# Patient Record
Sex: FEMALE | Race: WHITE | NOT HISPANIC OR LATINO | Employment: FULL TIME | URBAN - METROPOLITAN AREA
[De-identification: names, ages, dates, MRNs, and addresses within clinical notes are randomized per-mention and may not be internally consistent; named-entity substitution may affect disease eponyms.]

---

## 2020-12-28 ENCOUNTER — APPOINTMENT (OUTPATIENT)
Dept: GENERAL RADIOLOGY | Facility: CLINIC | Age: 50
End: 2020-12-28
Attending: PHYSICIAN ASSISTANT

## 2020-12-28 ENCOUNTER — HOSPITAL ENCOUNTER (EMERGENCY)
Facility: CLINIC | Age: 50
Discharge: HOME | End: 2020-12-29
Attending: PHYSICIAN ASSISTANT | Admitting: PHYSICIAN ASSISTANT
Payer: COMMERCIAL

## 2020-12-28 DIAGNOSIS — R42 DIZZINESS: ICD-10-CM

## 2020-12-28 DIAGNOSIS — E87.6 HYPOKALEMIA: ICD-10-CM

## 2020-12-28 DIAGNOSIS — R07.9 CHEST PAIN: ICD-10-CM

## 2020-12-28 LAB
ALBUMIN SERPL-MCNC: 3.4 G/DL (ref 3.4–5)
ALP SERPL-CCNC: 78 U/L (ref 40–150)
ALT SERPL W P-5'-P-CCNC: 13 U/L (ref 0–50)
ANION GAP SERPL CALCULATED.3IONS-SCNC: 6 MMOL/L (ref 3–14)
AST SERPL W P-5'-P-CCNC: 13 U/L (ref 0–45)
BASOPHILS # BLD AUTO: 0 10E9/L (ref 0–0.2)
BASOPHILS NFR BLD AUTO: 0.7 %
BILIRUB DIRECT SERPL-MCNC: <0.1 MG/DL (ref 0–0.2)
BILIRUB SERPL-MCNC: 0.2 MG/DL (ref 0.2–1.3)
BUN SERPL-MCNC: 15 MG/DL (ref 7–30)
CALCIUM SERPL-MCNC: 9.2 MG/DL (ref 8.5–10.1)
CHLORIDE SERPL-SCNC: 103 MMOL/L (ref 94–109)
CO2 SERPL-SCNC: 31 MMOL/L (ref 20–32)
CREAT SERPL-MCNC: 1.02 MG/DL (ref 0.52–1.04)
DIFFERENTIAL METHOD BLD: ABNORMAL
EOSINOPHIL # BLD AUTO: 0.2 10E9/L (ref 0–0.7)
EOSINOPHIL NFR BLD AUTO: 3.4 %
ERYTHROCYTE [DISTWIDTH] IN BLOOD BY AUTOMATED COUNT: 14.2 % (ref 10–15)
GFR SERPL CREATININE-BSD FRML MDRD: 65 ML/MIN/{1.73_M2}
GLUCOSE SERPL-MCNC: 102 MG/DL (ref 70–99)
HCT VFR BLD AUTO: 41.8 % (ref 35–47)
HGB BLD-MCNC: 13.1 G/DL (ref 11.7–15.7)
IMM GRANULOCYTES # BLD: 0 10E9/L (ref 0–0.4)
IMM GRANULOCYTES NFR BLD: 0.2 %
LIPASE SERPL-CCNC: 367 U/L (ref 73–393)
LYMPHOCYTES # BLD AUTO: 1.7 10E9/L (ref 0.8–5.3)
LYMPHOCYTES NFR BLD AUTO: 27.5 %
MCH RBC QN AUTO: 27.9 PG (ref 26.5–33)
MCHC RBC AUTO-ENTMCNC: 31.3 G/DL (ref 31.5–36.5)
MCV RBC AUTO: 89 FL (ref 78–100)
MONOCYTES # BLD AUTO: 0.5 10E9/L (ref 0–1.3)
MONOCYTES NFR BLD AUTO: 8 %
NEUTROPHILS # BLD AUTO: 3.7 10E9/L (ref 1.6–8.3)
NEUTROPHILS NFR BLD AUTO: 60.2 %
NRBC # BLD AUTO: 0 10*3/UL
NRBC BLD AUTO-RTO: 0 /100
PLATELET # BLD AUTO: 261 10E9/L (ref 150–450)
POTASSIUM SERPL-SCNC: 3.2 MMOL/L (ref 3.4–5.3)
PROT SERPL-MCNC: 7.3 G/DL (ref 6.8–8.8)
RBC # BLD AUTO: 4.7 10E12/L (ref 3.8–5.2)
SODIUM SERPL-SCNC: 140 MMOL/L (ref 133–144)
TROPONIN I SERPL-MCNC: <0.015 UG/L (ref 0–0.04)
WBC # BLD AUTO: 6.1 10E9/L (ref 4–11)

## 2020-12-28 PROCEDURE — 96361 HYDRATE IV INFUSION ADD-ON: CPT

## 2020-12-28 PROCEDURE — 71046 X-RAY EXAM CHEST 2 VIEWS: CPT

## 2020-12-28 PROCEDURE — 80048 BASIC METABOLIC PNL TOTAL CA: CPT | Performed by: EMERGENCY MEDICINE

## 2020-12-28 PROCEDURE — 258N000003 HC RX IP 258 OP 636: Performed by: PHYSICIAN ASSISTANT

## 2020-12-28 PROCEDURE — 99285 EMERGENCY DEPT VISIT HI MDM: CPT | Mod: 25

## 2020-12-28 PROCEDURE — 250N000013 HC RX MED GY IP 250 OP 250 PS 637: Performed by: PHYSICIAN ASSISTANT

## 2020-12-28 PROCEDURE — 85025 COMPLETE CBC W/AUTO DIFF WBC: CPT | Performed by: EMERGENCY MEDICINE

## 2020-12-28 PROCEDURE — 96360 HYDRATION IV INFUSION INIT: CPT

## 2020-12-28 PROCEDURE — 80076 HEPATIC FUNCTION PANEL: CPT | Performed by: EMERGENCY MEDICINE

## 2020-12-28 PROCEDURE — 83690 ASSAY OF LIPASE: CPT | Performed by: EMERGENCY MEDICINE

## 2020-12-28 PROCEDURE — 84484 ASSAY OF TROPONIN QUANT: CPT | Performed by: EMERGENCY MEDICINE

## 2020-12-28 RX ORDER — MECLIZINE HYDROCHLORIDE 25 MG/1
25 TABLET ORAL ONCE
Status: COMPLETED | OUTPATIENT
Start: 2020-12-28 | End: 2020-12-28

## 2020-12-28 RX ORDER — POTASSIUM CHLORIDE 1500 MG/1
40 TABLET, EXTENDED RELEASE ORAL ONCE
Status: DISCONTINUED | OUTPATIENT
Start: 2020-12-28 | End: 2020-12-29 | Stop reason: HOSPADM

## 2020-12-28 RX ORDER — MECLIZINE HYDROCHLORIDE 25 MG/1
25 TABLET ORAL 3 TIMES DAILY PRN
Qty: 30 TABLET | Refills: 0 | Status: SHIPPED | OUTPATIENT
Start: 2020-12-28

## 2020-12-28 RX ADMIN — SODIUM CHLORIDE 1000 ML: 9 INJECTION, SOLUTION INTRAVENOUS at 22:25

## 2020-12-28 RX ADMIN — MECLIZINE HYDROCHLORIDE 25 MG: 25 TABLET ORAL at 22:25

## 2020-12-28 ASSESSMENT — ENCOUNTER SYMPTOMS
HEADACHES: 1
SINUS PRESSURE: 1
WEAKNESS: 0
BLOOD IN STOOL: 0
SHORTNESS OF BREATH: 1
SINUS PAIN: 1
SORE THROAT: 0
NERVOUS/ANXIOUS: 1
VOMITING: 0
NUMBNESS: 1
COUGH: 0
LIGHT-HEADEDNESS: 1
DIZZINESS: 1
ABDOMINAL PAIN: 1
DIARRHEA: 0
NAUSEA: 1
NECK PAIN: 1

## 2020-12-28 NOTE — ED AVS SNAPSHOT
[facility] Emergency Dept  [address]  [facility] [address]  Phone: [phone]  Fax: [phone]                                    Bita Umana   MRN: [id number]    Department: [facility] Emergency Dept   Date of Visit: 12/28/2020           After Visit Summary Signature Page    I have received my discharge instructions, and my questions have been answered. I have discussed any challenges I see with this plan with the nurse or doctor.    ..........................................................................................................................................  Patient/Patient Representative Signature      ..........................................................................................................................................  Patient Representative Print Name and Relationship to Patient    ..................................................               ................................................  Date                                   Time    ..........................................................................................................................................  Reviewed by Signature/Title    ...................................................              ..............................................  Date                                               Time          22EPIC Rev 08/18

## 2020-12-29 VITALS
DIASTOLIC BLOOD PRESSURE: 90 MMHG | TEMPERATURE: 97.2 F | RESPIRATION RATE: 20 BRPM | SYSTOLIC BLOOD PRESSURE: 149 MMHG | OXYGEN SATURATION: 99 % | HEART RATE: 58 BPM

## 2020-12-29 LAB — INTERPRETATION ECG - MUSE: NORMAL

## 2020-12-29 NOTE — DISCHARGE INSTRUCTIONS
Discharge Instructions  Dizziness (Lightheaded)  Today you were seen for dizziness.  Dizziness can be caused by many things and it can be very difficult to determine the cause of dizziness.  At this time, your provider has found no signs that your dizziness is due to a serious or life-threatening condition. However, sometimes there is a serious problem that does not show up right away, and it is important for you to follow up with your regular provider as instructed.  Generally, every Emergency Department visit should have a follow-up clinic visit with either a primary or a specialty clinic/provider. Please follow-up as instructed by your emergency provider today.      Return to the Emergency Department if:    You pass out (fainting or falling out), especially during exercise.    You develop chest pain, chest pressure or difficulty breathing.  Your feel an irregular heartbeat.  You have excessive vaginal bleeding, or blood in your stool or vomit (throw up).  You have a high fever.  Your symptoms get worse or more frequent.    If when you begin to feel dizzy or lightheaded, it is important to sit down or lay down immediately to prevent injury from falling.  If you were given a prescription for medicine here today, be sure to read all of the information (including the package insert) that comes with your prescription.  This will include important information about the medicine, its side effects, and any warnings that you need to know about.  The pharmacist who fills the prescription can provide more information and answer questions you may have about the medicine.  If you have questions or concerns that the pharmacist cannot address, please call or return to the Emergency Department.   Remember that you can always come back to the Emergency Department if you are not able to see your regular provider in the amount of time listed above, if you get any new symptoms, or if there is anything that worries you.    Discharge  Instructions  Vertigo  You have been diagnosed with vertigo.  This is a dizzy feeling often described as spinning or that the room is moving around you. You will often have nausea (sick to your stomach), vomiting (throwing up), and balance problems with it.  Vertigo is usually caused by a problem in the inner ear which helps control your balance.  Many things can cause vertigo, including calcium collections in the inner ear, a virus infection of the inner ear, concussion, migraine, and some medicines.  Luckily, these causes are not life threatening and will eventually go away.  However, sometimes there is a serious problem that does not show up right away.  Generally, every Emergency Department visit should have a follow-up clinic visit with either a primary or a specialty clinic/provider. Please follow-up as instructed by your emergency provider today.  Return to the Emergency Department if you have:  New or severe headache.  Double vision (seeing two of things).  Trouble speaking or hearing.  Weakness or trouble moving/using one side of your body.  Passing out.  Numbness or tingling.  Chest pain.  Vomiting that will not stop.    Treatment:  There are several commonly prescribed medications:  Antihistamines such as meclizine (Antivert ), dimenhydrinate (Dramamine ), or diphenhydramine (Benadryl ).  Prescription anti-nausea medicines, such as promethazine (Phenergan ), metoclopramide (Reglan ), or ondansetron (Zofran ).  Prescription sedative medicines, such as diazepam (Valium ), lorazepam (Ativan ), or clonazepam (Klonopin ).  Most of these medicines make you sleepy, and you should not take them before you work or drive. You should only take prescription medicines to treat severe vertigo symptoms, and you should stop the medicine when your symptoms improve.    Follow Up:  If you have vertigo longer than three days, it is important that you follow up either with your primary provider or an Ear, Nose, and Throat  (ENT) specialist.  You may need further testing to evaluate your vertigo and you may also need  vestibular  therapy which is a special form of physical therapy to make the vertigo go away.    If you were given a prescription for medicine here today, be sure to read all of the information (including the package insert) that comes with your prescription.  This will include important information about the medicine, its side effects, and any warnings that you need to know about.  The pharmacist who fills the prescription can provide more information and answer questions you may have about the medicine.  If you have questions or concerns that the pharmacist cannot address, please call or return to the Emergency Department.     Remember that you can always come back to the Emergency Department if you are not able to see your regular provider in the amount of time listed above, if you get any new symptoms, or if there is anything that worries you.    Discharge Instructions  Chest Pain    You have been seen today for chest pain or discomfort.  At this time, your provider has found no signs that your chest pain is due to a serious or life-threatening condition, (or you have declined more testing and/or admission to the hospital). However, sometimes there is a serious problem that does not show up right away. Your evaluation today may not be complete and you may need further testing and evaluation.     Generally, every Emergency Department visit should have a follow-up clinic visit with either a primary or a specialty clinic/provider. Please follow-up as instructed by your emergency provider today.  Return to the Emergency Department if:  Your chest pain changes, gets worse, starts to happen more often, or comes with less activity.  You are newly short of breath.  You get very weak or tired.  You pass out or faint.  You have any new symptoms, like fever, cough, numb legs, or you cough up blood.  You have anything else that  worries you.    Until you follow-up with your regular provider, please do the following:  Take one aspirin daily unless you have an allergy or are told not to by your provider.  If a stress test appointment has been made, go to the appointment.  If you have questions, contact your regular provider.  Follow-up with your regular provider/clinic as directed; this is very important.    If you were given a prescription for medicine here today, be sure to read all of the information (including the package insert) that comes with your prescription.  This will include important information about the medicine, its side effects, and any warnings that you need to know about.  The pharmacist who fills the prescription can provide more information and answer questions you may have about the medicine.  If you have questions or concerns that the pharmacist cannot address, please call or return to the Emergency Department.       Remember that you can always come back to the Emergency Department if you are not able to see your regular provider in the amount of time listed above, if you get any new symptoms, or if there is anything that worries you.

## 2020-12-29 NOTE — ED PROVIDER NOTES
"History   Chief Complaint:  Chest Pain     The history is provided by the patient.      Bita Umana is a 50 year old female with history of hypertension, prediabetes, sinusitis, vertigo, and AJ on CPAP, who presents with chest pain, lightheaded, \"felt like [she] was spinning,\" and shortness of breath for the last three days that developed while she was driving. She notes that she had to pull over and felt like she was going to pass out. This lasted for just a few minutes, and she was able to make it home safely. The next day, the patient developed the same symptoms in addition to mid-sternal/epigastric pain. Yesterday, the patient had a similar episode and felt like she was on a roller coaster. She notes mild dizziness and lightheadedness is present at rest but significantly worsens when she moves her head.    Today, about two hours prior to arrival the patient redeveloped her dizziness and lightheadedness, in addition to localized right arm numbness, which she notes is still present here but more mild than before. She also notes chronic numbness to the right hand 2/2 carpal tunnel and her currently right arm sensory problems feel similar to this chronic carpal tunnel issue. Patient complains of neck pain with no known falls, injury, or trauma. She also reports some intermittent headache, nausea, and left ear pressure. To note, the patient has a history of sinusitis and vertigo but states it has never been this bad before. Due to her persisting symptoms, she elected to seek evaluation in the ED today.     The patient also reports taking 100 mg of her 's Metoprolol in hopes it would bring down her high blood pressure as she has been noting increased BPs over the last couple of days.     Here, the patient reports experiencing chest pain, ear pressure, left worse than right, and dizziness. Patient denies any vomiting, diarrhea, blood in stool, sore throat, cough, cold, vision loss, difficulty " speaking or swallowing, weakness in her arms, numbness in any other extremities aside from her right arm, ill contacts, or known exposure to COVID-19. To note the patient has had a tubal ligation but still has her gall bladder and appendix. To note, the patient has an appointment with her PCP in two days regarding these symptoms.     Review of Systems   HENT: Positive for ear pain, postnasal drip, sinus pressure and sinus pain. Negative for sore throat.    Eyes: Positive for visual disturbance.   Respiratory: Positive for shortness of breath. Negative for cough.    Cardiovascular: Positive for chest pain.   Gastrointestinal: Positive for abdominal pain (epigastric) and nausea. Negative for blood in stool, diarrhea and vomiting.   Musculoskeletal: Positive for neck pain.   Neurological: Positive for dizziness, syncope (near), light-headedness, numbness (right arm) and headaches. Negative for weakness.   Psychiatric/Behavioral: The patient is nervous/anxious.    All other systems reviewed and are negative.    Allergies:  No Known Drug Allergies    Medications:  Oretic  Gabapentin  Prilosec  Hydrochlorothiazide     Past Medical History:    AJ on CPAP  Prediabetes  Morbid obesity  Hypertension      Past Surgical History:    Tubal ligation  Hip surgery  [place] teeth extraction  Breast biopsy  D&C    Family History:    Lung cancer  Cataracts   Glaucoma  Hypertension  Arthritis    Social History:  Smoking status: Former - quit date: 2012  Alcohol use: Negative  Drug use: Negative  Marital Status:   PCP: [facility]    Physical Exam     Patient Vitals for the past 24 hrs:   BP Temp Temp src Pulse Resp SpO2   12/28/20 2330 (!) 149/90 -- -- 58 -- 100 %   12/28/20 2315 (!) 147/97 -- -- -- -- 100 %   12/28/20 2300 -- -- -- 66 -- --   12/28/20 2245 (!) 145/88 -- -- 59 20 100 %   12/28/20 2230 (!) 155/87 -- -- 59 21 100 %   12/28/20 2225 -- -- -- 59 17 100 %   12/28/20 2223 (!) 151/90 -- -- 58 -- --    12/28/20 2215 (!) 150/93 -- -- 55 10 100 %   12/28/20 2200 (!) 159/101 -- -- 56 30 100 %   12/28/20 2115 (!) 150/86 -- -- 59 -- 100 %   12/28/20 2105 (!) 156/99 -- -- 66 -- 100 %   12/28/20 1948 (!) 155/110 97.2  F (36.2  C) Temporal 62 20 100 %     The patient's orthostatic vitals were monitored here in the emergency department and found to be 151/99 with a pulse of 56 while lying, 150/93 with a pulse of 57 while sitting, and 176/123 with a pulse of 66 while standing.    Physical Exam  General: Resting comfortably.  Alert and oriented.   Head:  The scalp, face, and head appear normal   Eyes:  Conjunctivae and sclerae are normal    ENT:    The oropharynx is normal    Uvula is in the midline    CV:  Regular rate and rhythm     Normal S1/S2    Peripheral pulses intact in all 4 extremities    No peripheral edema  Resp:  Lungs are clear to auscultation    Non-labored    No rales or wheezing   GI:  Abdomen is soft, non-distended    No abdominal tenderness   MS:  Normal muscular tone   Skin:  No rash or acute skin lesions noted   Neuro: Speech is normal and fluent.     Cranial nerves 2-12 grossly intact.      strength is equal.     Strength and sensation intact in all 4 extremities.     Finger-nose finger and heel shin intact.     No focal deficits.     5/5 strength with bicep flection and triceps extension bilaterally.    Preserved deltoid strength bilaterally.    5/5 strength with dorsi and plantar flexion bilaterally.    No drift.     Emergency Department Course     ECG:  Indication: Chest pain   Time: 1957  Vent. Rate 63 bpm. MO interval 166. QRS duration 88. QT/QTc 434/444. P-R-T axis 39 60 37. Normal sinus rhythm. Normal ECG. Read time: 2009    Imaging:  Radiology findings were communicated with the patient who voiced understanding of the findings.    XR Chest, G/E 2 views:   Negative chest. As per radiology.    Laboratory:  Laboratory findings were communicated with the patient who voiced understanding of the  findings.    CBC: WBC: 6.1, HGB: 13.1, PLT: 261  BMP: Glucose: 102 (H), Potassium: 3.2 (L), o/w WNL (Creatinine: 1.02)    Lipase: 367    Hepatic Panel: WNL    Troponin (2028): <0.015     Interventions:  2225 NS 1L IV  2225 Meclizine 25 mg PO    Emergency Department Course:    Reviewed:  2140 I reviewed the patient's nursing notes, vitals, past medical records, and Care Everywhere.     Assessments:  2145 I performed an exam of the patient as documented above.     2340 I rechecked the patient and discussed the results of her workup thus far.     Disposition:  The patient was discharged to home.     Impression & Plan   Medical Decision Making:  Bita Umana is a 50 year old female who presents today for evaluation of dizziness, chest pain among other symptoms as above. Please refer to the HPI for full details.  Patient presents vitally stable and afebrile.  She is overall well-appearing.  Patient describes these episodes of room spinning dizziness with motion of her head.  She also complains of intermittent chest pain and states this is mildly present on upon evaluation.  She also states she had an odd episode where her right arm felt numb, but denies any other neurologic symptoms, and she states this is significantly improved and now the only numbness is her normal carpal tunnel numbness.  EKG demonstrates normal sinus rhythm without signs of ischemia or arrhythmia.  Troponin is normal.  Lab work is reassuring including a normal lipase.  Her potassium is mildly low at 3.2 this was replaced here.  Chest x-ray is normal.  Patient does feel much improved after interventions here.  She is not orthostatic.  Her neurologic exam is completely normal.  No indication for emergent CT or MRI imaging at this time. I feel this is likely a peripheral cause of vertigo given her discription. The patient states that she has had this in the past and this feels somewhat similar although more apparent this time.  She has been  to therapy for this in the past.  Did discuss that she needs to follow-up with her primary care doctor, and she incidentally already has an appointment in 2 days for recheck.  She was encouraged to also continue therapy that she has had in the past for symptoms continue.  Overall, believe the patient is safe to discharge home.  There is no occasion for further work-up at this time. I do not feel she needs to be admitted, but did encourage return if symptoms change or worsen at all. Encouraged increased potassium taking foods over the next couple days.  Red flag symptoms, reasons to return were discussed and understood.  All questions were answered prior to discharge.  The patient understands and agrees with plan.    Diagnosis:    ICD-10-CM    1. Chest pain  R07.9    2. Dizziness  R42    3. Hypokalemia  E87.6        Disposition:  Discharged to home.    Discharge Medications:  New Prescriptions    MECLIZINE (ANTIVERT) 25 MG TABLET    Take 1 tablet (25 mg) by mouth 3 times daily as needed for dizziness       Scribe Disclosure:  I, Anna Salomon, am serving as a scribe on 12/28/2020 at 9:00 PM to personally document services performed by Rosaura Rivas PA* based on my observations and the provider's statements to me.      Rosarua Rivas PA-C  12/29/20 [id number]

## 2020-12-29 NOTE — ED NOTES
HR remained unchanged with orthostatics, BP increased both sys and rufus >20 with sitting to standing.  Back down to baseline after sitting back in bed from standing. No increased dizziness or light-headed feeling.

## 2021-01-01 ENCOUNTER — APPOINTMENT (OUTPATIENT)
Dept: CT IMAGING | Facility: CLINIC | Age: 51
End: 2021-01-01
Attending: EMERGENCY MEDICINE

## 2021-01-01 ENCOUNTER — APPOINTMENT (OUTPATIENT)
Dept: GENERAL RADIOLOGY | Facility: CLINIC | Age: 51
End: 2021-01-01
Attending: EMERGENCY MEDICINE

## 2021-01-01 ENCOUNTER — HOSPITAL ENCOUNTER (EMERGENCY)
Facility: CLINIC | Age: 51
Discharge: HOME | End: 2021-01-01
Attending: EMERGENCY MEDICINE | Admitting: EMERGENCY MEDICINE

## 2021-01-01 ENCOUNTER — APPOINTMENT (OUTPATIENT)
Dept: MRI IMAGING | Facility: CLINIC | Age: 51
End: 2021-01-01
Attending: STUDENT IN AN ORGANIZED HEALTH CARE EDUCATION/TRAINING PROGRAM

## 2021-01-01 VITALS
SYSTOLIC BLOOD PRESSURE: 167 MMHG | OXYGEN SATURATION: 98 % | BODY MASS INDEX: 47.19 KG/M2 | TEMPERATURE: 97.5 F | HEIGHT: 63 IN | DIASTOLIC BLOOD PRESSURE: 99 MMHG | HEART RATE: 77 BPM | RESPIRATION RATE: 12 BRPM | WEIGHT: 266.32 LBS

## 2021-01-01 DIAGNOSIS — R51.9 ACUTE NONINTRACTABLE HEADACHE, UNSPECIFIED HEADACHE TYPE: ICD-10-CM

## 2021-01-01 DIAGNOSIS — R42 VERTIGO: ICD-10-CM

## 2021-01-01 LAB
ANION GAP SERPL CALCULATED.3IONS-SCNC: 5 MMOL/L (ref 3–14)
APTT PPP: 25 SEC (ref 22–37)
BASOPHILS # BLD AUTO: 0 10E9/L (ref 0–0.2)
BASOPHILS NFR BLD AUTO: 0.8 %
BUN SERPL-MCNC: 14 MG/DL (ref 7–30)
CALCIUM SERPL-MCNC: 8.9 MG/DL (ref 8.5–10.1)
CHLORIDE SERPL-SCNC: 106 MMOL/L (ref 94–109)
CO2 SERPL-SCNC: 29 MMOL/L (ref 20–32)
CREAT SERPL-MCNC: 0.82 MG/DL (ref 0.52–1.04)
DIFFERENTIAL METHOD BLD: ABNORMAL
EOSINOPHIL # BLD AUTO: 0.2 10E9/L (ref 0–0.7)
EOSINOPHIL NFR BLD AUTO: 3.7 %
ERYTHROCYTE [DISTWIDTH] IN BLOOD BY AUTOMATED COUNT: 14.2 % (ref 10–15)
FLUABV+SARS-COV-2+RSV PNL RESP NAA+PROBE: NEGATIVE
FLUABV+SARS-COV-2+RSV PNL RESP NAA+PROBE: NEGATIVE
GFR SERPL CREATININE-BSD FRML MDRD: 84 ML/MIN/{1.73_M2}
GLUCOSE BLDC GLUCOMTR-MCNC: 107 MG/DL (ref 70–99)
GLUCOSE SERPL-MCNC: 118 MG/DL (ref 70–99)
HCT VFR BLD AUTO: 41.1 % (ref 35–47)
HGB BLD-MCNC: 12.3 G/DL (ref 11.7–15.7)
IMM GRANULOCYTES # BLD: 0 10E9/L (ref 0–0.4)
IMM GRANULOCYTES NFR BLD: 0.2 %
INR PPP: 0.91 (ref 0.86–1.14)
LABORATORY COMMENT REPORT: NORMAL
LYMPHOCYTES # BLD AUTO: 1.3 10E9/L (ref 0.8–5.3)
LYMPHOCYTES NFR BLD AUTO: 27.1 %
MCH RBC QN AUTO: 27.2 PG (ref 26.5–33)
MCHC RBC AUTO-ENTMCNC: 29.9 G/DL (ref 31.5–36.5)
MCV RBC AUTO: 91 FL (ref 78–100)
MONOCYTES # BLD AUTO: 0.4 10E9/L (ref 0–1.3)
MONOCYTES NFR BLD AUTO: 8.4 %
NEUTROPHILS # BLD AUTO: 2.9 10E9/L (ref 1.6–8.3)
NEUTROPHILS NFR BLD AUTO: 59.8 %
NRBC # BLD AUTO: 0 10*3/UL
NRBC BLD AUTO-RTO: 0 /100
PLATELET # BLD AUTO: 244 10E9/L (ref 150–450)
POTASSIUM SERPL-SCNC: 3.2 MMOL/L (ref 3.4–5.3)
RBC # BLD AUTO: 4.53 10E12/L (ref 3.8–5.2)
RSV RNA SPEC QL NAA+PROBE: NORMAL
SARS-COV-2 RNA SPEC QL NAA+PROBE: NEGATIVE
SODIUM SERPL-SCNC: 140 MMOL/L (ref 133–144)
SPECIMEN SOURCE: NORMAL
TROPONIN I SERPL-MCNC: <0.015 UG/L (ref 0–0.04)
WBC # BLD AUTO: 4.9 10E9/L (ref 4–11)

## 2021-01-01 PROCEDURE — 87636 SARSCOV2 & INF A&B AMP PRB: CPT | Performed by: EMERGENCY MEDICINE

## 2021-01-01 PROCEDURE — 70551 MRI BRAIN STEM W/O DYE: CPT

## 2021-01-01 PROCEDURE — 85610 PROTHROMBIN TIME: CPT | Performed by: EMERGENCY MEDICINE

## 2021-01-01 PROCEDURE — G0425 INPT/ED TELECONSULT30: HCPCS | Mod: GC | Performed by: PSYCHIATRY & NEUROLOGY

## 2021-01-01 PROCEDURE — 0042T CT HEAD PERFUSION WITH CONTRAST: CPT

## 2021-01-01 PROCEDURE — 250N000011 HC RX IP 250 OP 636: Performed by: EMERGENCY MEDICINE

## 2021-01-01 PROCEDURE — 96375 TX/PRO/DX INJ NEW DRUG ADDON: CPT | Mod: 59

## 2021-01-01 PROCEDURE — 85730 THROMBOPLASTIN TIME PARTIAL: CPT | Performed by: EMERGENCY MEDICINE

## 2021-01-01 PROCEDURE — 96361 HYDRATE IV INFUSION ADD-ON: CPT

## 2021-01-01 PROCEDURE — 250N000009 HC RX 250: Performed by: EMERGENCY MEDICINE

## 2021-01-01 PROCEDURE — 84484 ASSAY OF TROPONIN QUANT: CPT | Performed by: EMERGENCY MEDICINE

## 2021-01-01 PROCEDURE — 99285 EMERGENCY DEPT VISIT HI MDM: CPT | Mod: 25

## 2021-01-01 PROCEDURE — 258N000003 HC RX IP 258 OP 636: Performed by: EMERGENCY MEDICINE

## 2021-01-01 PROCEDURE — 80048 BASIC METABOLIC PNL TOTAL CA: CPT | Performed by: EMERGENCY MEDICINE

## 2021-01-01 PROCEDURE — C9803 HOPD COVID-19 SPEC COLLECT: HCPCS

## 2021-01-01 PROCEDURE — 70496 CT ANGIOGRAPHY HEAD: CPT

## 2021-01-01 PROCEDURE — 250N000013 HC RX MED GY IP 250 OP 250 PS 637: Performed by: EMERGENCY MEDICINE

## 2021-01-01 PROCEDURE — 93005 ELECTROCARDIOGRAM TRACING: CPT

## 2021-01-01 PROCEDURE — 999N001017 HC STATISTIC GLUCOSE BY METER IP

## 2021-01-01 PROCEDURE — 96374 THER/PROPH/DIAG INJ IV PUSH: CPT | Mod: 59

## 2021-01-01 PROCEDURE — 85025 COMPLETE CBC W/AUTO DIFF WBC: CPT | Performed by: EMERGENCY MEDICINE

## 2021-01-01 PROCEDURE — 71045 X-RAY EXAM CHEST 1 VIEW: CPT

## 2021-01-01 PROCEDURE — 70450 CT HEAD/BRAIN W/O DYE: CPT | Mod: XS

## 2021-01-01 RX ORDER — DIPHENHYDRAMINE HYDROCHLORIDE 50 MG/ML
25 INJECTION INTRAMUSCULAR; INTRAVENOUS ONCE
Status: COMPLETED | OUTPATIENT
Start: 2021-01-01 | End: 2021-01-01

## 2021-01-01 RX ORDER — IOPAMIDOL 755 MG/ML
500 INJECTION, SOLUTION INTRAVASCULAR ONCE
Status: COMPLETED | OUTPATIENT
Start: 2021-01-01 | End: 2021-01-01

## 2021-01-01 RX ORDER — DEXAMETHASONE SODIUM PHOSPHATE 10 MG/ML
10 INJECTION, SOLUTION INTRAMUSCULAR; INTRAVENOUS ONCE
Status: COMPLETED | OUTPATIENT
Start: 2021-01-01 | End: 2021-01-01

## 2021-01-01 RX ORDER — MECLIZINE HYDROCHLORIDE 25 MG/1
25 TABLET ORAL ONCE
Status: COMPLETED | OUTPATIENT
Start: 2021-01-01 | End: 2021-01-01

## 2021-01-01 RX ORDER — DIAZEPAM 10 MG/2ML
2.5 INJECTION, SOLUTION INTRAMUSCULAR; INTRAVENOUS ONCE
Status: DISCONTINUED | OUTPATIENT
Start: 2021-01-01 | End: 2021-01-01

## 2021-01-01 RX ADMIN — DIPHENHYDRAMINE HYDROCHLORIDE 25 MG: 50 INJECTION INTRAMUSCULAR; INTRAVENOUS at 18:30

## 2021-01-01 RX ADMIN — MECLIZINE HYDROCHLORIDE 25 MG: 25 TABLET ORAL at 19:29

## 2021-01-01 RX ADMIN — PROCHLORPERAZINE EDISYLATE 10 MG: 5 INJECTION INTRAMUSCULAR; INTRAVENOUS at 18:33

## 2021-01-01 RX ADMIN — SODIUM CHLORIDE 95 ML: 9 INJECTION, SOLUTION INTRAVENOUS at 17:26

## 2021-01-01 RX ADMIN — DEXAMETHASONE SODIUM PHOSPHATE 10 MG: 10 INJECTION, SOLUTION INTRAMUSCULAR; INTRAVENOUS at 18:32

## 2021-01-01 RX ADMIN — SODIUM CHLORIDE 1000 ML: 9 INJECTION, SOLUTION INTRAVENOUS at 18:02

## 2021-01-01 RX ADMIN — IOPAMIDOL 120 ML: 755 INJECTION, SOLUTION INTRAVENOUS at 17:26

## 2021-01-01 ASSESSMENT — ENCOUNTER SYMPTOMS
NUMBNESS: 1
HEADACHES: 1
DIZZINESS: 1

## 2021-01-01 ASSESSMENT — MIFFLIN-ST. JEOR: SCORE: 1807.13

## 2021-01-01 NOTE — ED TRIAGE NOTES
Pt arrived with acute onset of left arm and left leg numbness while visiting her dad 20 minutes PTA. Pt also reports dizziness, SOB, nausea, and severe left-sided neck pain. A&Ox3.

## 2021-01-01 NOTE — ED PROVIDER NOTES
"  History   Chief Complaint:  Left-Sided Numbness    The history is provided by the patient. The history is limited by the condition of the patient.      Bita Umana is a 50 year old female with history of hypertension who presents with left-sided numbness. Today around 1640 the patient started to develop numbness in her left arm and left leg as well as dizziness, a left-sided headache, chest pain, and neck pain. Due to these new symptoms, the patient came into the ED for evaluation.  The patient notes that when her symptoms started she began to feel very dizzy and felt much worse looking to the left.  She felt that she might even fall towards the left.     Review of Systems   Unable to perform ROS: Acuity of condition   Neurological: Positive for dizziness, numbness and headaches.      Allergies:  Amlodipine     Medications:  Meclizine   Citracal + D   Vitamin B-6   Tizanidine   Fergon  Potassium chloride   Baclofen  Wellbutrin SR   Revia   Flonase  Zyrtec  Gabapentin   Omeprazole  Flexeril   Hydrochlorothiazide   Losartan  Pepid  Flonase  Augmentin     Past Medical History:    Hypertension  Obstructive sleep apnea   Osteoarthritis  Vitamin D deficiency     Past Surgical History:    Tubal ligation  Hip surgery, left  [place] teeth extraction  Left breast biopsy  Dilation and curettage      Family History:    Cancer - Father   Cataract - Father   Glaucoma - Father   Hypertension - Father   Arthritis - Mother      Social History:  The patient presents to the ED alone.     Physical Exam     Patient Vitals for the past 24 hrs:   BP Temp Temp src Pulse Resp SpO2 Height Weight   01/01/21 1830 (!) 167/99 -- -- 77 12 98 % -- --   01/01/21 1826 -- -- -- -- -- -- 1.6 m (5' 3\") 120.8 kg (266 lb 5.1 oz)   01/01/21 1800 (!) 166/106 -- -- 64 10 100 % -- --   01/01/21 1745 (!) 160/106 -- -- 73 9 99 % -- --   01/01/21 1740 -- 97.5  F (36.4  C) Oral -- 20 -- -- --   01/01/21 1724 (!) 178/124 -- -- 70 -- 96 % -- --   01/01/21 " 1715 -- 97.5  F (36.4  C) Oral -- 20 -- -- --       Physical Exam  Constitutional: Vital signs reviewed as above.   HENT:    Head: No external signs of trauma noted.   Eyes: Pupils are equal, round, and reactive to light. No nystagmus noted.   Ears: Normal bilateral external canals.  Normal bilateral tympanic membranes.   Oropharynx: MMM  Cardiovascular: Normal rate, regular rhythm, normal heart sounds and intact distal pulses.    Pulmonary/Chest: Effort normal and breath sounds normal. No respiratory distress. No wheezes noted.   Gastrointestinal: Soft. There is no tenderness. There is no rebound.   Musculoskeletal:   No deformities appreciated   No edema noted  Neurological:    Patient is alert and oriented to person, place, and time.    Speech is fluent, cognition is normal.   CN 2-12 intact (PERRL, EOMI, symmetric smile, equal eye squeeze and forehead raise, normal and equal sensation to bilateral forehead/cheek/chin, equal hearing to finger rub, midline tongue protrusion with nl side-to-side movement, normal shoulder shrug).    RUE strength 4/5: , finger abd, wrist flex/ext, elbow flex/ext.    LUE strength 4/5: , finger abd, wrist flex/ext, elbow flex/ext.    RLE strength 4/5: ankle flex/ext, knee flex/ext, hip flex.    LLE strength 3/5: ankle flex/ext, knee flex/ext, hip flex.    Sensation lightly decreased in the left lower extremity especially the left lateral calf area compared with the right lower extremity.  Bilateral upper extremities have normal sensation.     No arm drift.     There does appear to be decreased effort against gravity in the left lower extremity compared to the right.   Cerebellar: Normal B/L UE rapid pronation/supination, hand rolling.  The patient notes dizziness with finger-to-nose testing when looking and extending to the left.  Central and right-sided testing appears normal.  There seem to be some noted difficulty with left heel to right shin testing.  Right heel to left  shin appears normal.  Skin: Skin is warm and dry.   Psychiatric: The patient appears anxious     Emergency Department Course   ECG (17:38:11):  Indication: Screening for cardiovascular disease.   Rate 75 bpm. IN interval 174 ms. QRS duration 86 ms. QT/QTc 434/484 ms. P-R-T axes 35 50 46.   Interpretation: Normal sinus rhythm, Prolonged QT, Abnormal ECG, QTc 484 today as compared to EKG dated 12/28/2020  Agree with computer interpretation. Yes   Interpreted at 1743 by Dr. Schuler.      Imaging:  XR Chest Port:  IMPRESSION: Slight prominence left ventricle with mild torsion aorta. Lungs clear.  Per radiology.     CT Head Perfusion w Contrast:  IMPRESSION: Unremarkable CT perfusion.   Per radiology.      CTA Head Neck w Contrast:  IMPRESSION:   HEAD CTA:   1.  No evidence of large vessel occlusion or high-grade stenosis.     NECK CTA:   1.  No evidence of large vessel occlusion or high-grade stenosis.  Preliminary report per radiology.      CT Head w/o Contrast:  IMPRESSION: No CT evidence of acute ischemia or hemorrhage.     Findings were discussed by phone to Dr. English and Dr. Schuler at 5:29 PM on 01/01/2021.   LAURA ENGLISH MD  Per radiology.      MR Brain w/o Contrast:  IMPRESSION:   1.  No evidence of acute ischemia or hemorrhage.  Per radiology.      Laboratory:  CBC: WNL (WBC 4.9, HGB 12.3, )   BMP: Potassium 3.2 low, Glucose 118 high, o/w WNL (Creatinine 0.82)   Glucose by meter: 107 high   INR: 0.91   Partial thromboplastin time: 25   Troponin I 1714: <0.015   Symptomatic Influenza A/B & COVID-19 Virus PCR Multiplex: COVID-19 Negative, Influenza A/B Negative      Emergency Department Course:    Reviewed:  I reviewed nursing notes, vitals, past medical history and care everywhere    Assessments:  1709: I performed an exam of the patient as documented above. Code stroke initiated.     ED Course as of Jan 01 2111 Fri Jan 01, 2021   1737 D/W Dr. English (neuroradiology). No stroke or bleed noted.  Perfusion pending.      1743 I updated and reassessed the patient. I completed my neuro exam.      1749 D/W Dr. Mejia. Will evaluate via Tele Stroke.      1818 D/W Dr. Mejia, post tele stroke evaluation.      1903 D/W Dr. Mejia. No stroke noted on [facility] MRI.      1914 Rechecked and updated. Feels much better (HA basically gone). Still notes dizziness especially when turning the head to the left. She notes that she can now look to the left with her eyes without issue, but when I had her rotate (even to about 30 degrees), she felt very dizzy.      1943 Rechecked and updated after ambulating to the bathroom.  Discussed disposition and plan.  Patient notes that she has only been using the meclizine she was prescribed here after her visit on 28 December once a day.  I informed her she could use this every 8 hours as needed.  She also notes that she saw her primary care physician at the end of December and that physician recommended what sounds like vestibular physical therapy.  The patient notes that she will call them early next week and work on getting an appointment.         Interventions:  1802 NS 1,000 mL IV   1830 Benadryl 25 mg IV   1832 Decadron 10 mg IV   1833 Compazine 10 mg IV   1929 Meclizine 25 mg PO      Disposition:  The patient was discharged to home.      Impression & Plan   Covid-19  Bita Umana was evaluated during a global COVID-19 pandemic, which necessitated consideration that the patient might be at risk for infection with the SARS-CoV-2 virus that causes COVID-19.   Applicable protocols for evaluation were followed during the patient's care.   COVID-19 was considered as part of the patient's evaluation. The plan for testing is:  a test was obtained during this visit.     CMS Diagnoses: The patient has stroke symptoms:         ED Stroke specific documentation           NIHSS PDF     Patient last known well time: 1640  ED Provider first to bedside at: 1709  CT Results received at: 1737    tPA:   Not given  due to no stroke on CT or MRI.    If treating with tPA: Ensure SBP<185 and DBP<105 prior to treatment with IV tPA.  Administering IV tPA after treatment with IV labetalol, hydralazine, or nicardipine is reasonable once BP control is established.    Endovascular Retrieval:  Not initiated due to absence of proximal vessel occlusion    National Institutes of Health Stroke Scale (Baseline)  Time Performed: 1709     Score    Level of consciousness: (0)   Alert, keenly responsive    LOC questions: (0)   Answers both questions correctly    LOC commands: (0)   Performs both tasks correctly    Best gaze: (0)   Normal    Visual: (0)   No visual loss    Facial palsy: (0)   Normal symmetrical movements    Motor arm (left): (0)   No drift    Motor arm (right): (0)   No drift    Motor leg (left): (2)   Some effort against gravity    Motor leg (right): (0)   No drift    Limb ataxia: (1)   Present in one limb (because she felt dizzy)    Sensory: (1)   Mild to moderate sensory loss (lateral left calf)    Best language: (0)   Normal- no aphasia    Dysarthria: (0)   Normal    Extinction and inattention: (0)   No abnormality        Total Score:  4        Stroke Mimics were considered (including migraine headache, seizure disorder, hypoglycemia (or hyperglycemia), head or spinal trauma, CNS infection, Toxin ingestion and shock state (e.g. sepsis) .     Medical Decision Making:    Bita Umana is a 50 year old female presented with a new onset headache and dizziness.  Evaluation in the emergency department, fortunately has been negative. The patient has not had any fever or specific neck stiffness so I doubt meningitis. The headache was sudden in onset, but has improved.  There is no associated paresthesia or confusion and I doubt stroke or CNS tumor.  She did seem to have some degree of left lower extremity weakness though neither I nor stroke neurology could completely explain this.  The patient was able to ambulate with a stable  gait after medication administration in the ED and her neurological functions improved as well.  The patient was treated symptomatically and pain has improved with medication interventions. The patient received neuroimaging that did not demonstrate a mass, aneurysm, or stroke. As the patient has been improving and in light of the negative workup, the patient wishes to be discharged home.  The patient knows to follow-up with her primary care clinic next week.  She already received information regarding what sounds like vestibular physical therapy and knows to call the clinic to set up the appointment next week.  If symptoms persist the patient should talk to her primary care clinic and should consider neurology follow-up as well. Anticipatory guidance given prior to discharge.    Diagnosis:    ICD-10-CM    1. Acute nonintractable headache, unspecified headache type  R51.9    2. Vertigo  R42         Scribe Disclosure:  I, Irving Orosco, am serving as a scribe at 5:09 PM on 1/1/2021 to document services personally performed by Dr. Schuler, based on my observations and the provider's statements to me.          Reilly Schuler DO  01/01/21 7556

## 2021-01-01 NOTE — ED AVS SNAPSHOT
[facility] Emergency Dept  [address]  [facility] [address]  Phone: [phone]  Fax: [phone]                                    Bita Umana   MRN: [id number]    Department: [facility] Emergency Dept   Date of Visit: 1/1/2021           After Visit Summary Signature Page    I have received my discharge instructions, and my questions have been answered. I have discussed any challenges I see with this plan with the nurse or doctor.    ..........................................................................................................................................  Patient/Patient Representative Signature      ..........................................................................................................................................  Patient Representative Print Name and Relationship to Patient    ..................................................               ................................................  Date                                   Time    ..........................................................................................................................................  Reviewed by Signature/Title    ...................................................              ..............................................  Date                                               Time          22EPIC Rev 08/18

## 2021-01-02 NOTE — CONSULTS
"[facility]    Stroke Consult Note    Reason for Consult: Stroke Code    Chief Complaint: No chief complaint on file.      HPI  Bita Umana is a 50 year old female with history of vertigo who presents as a stroke code for sudden onset vertigo and is found to have left sided sensory disturbance and LLE weakness/incoordination.    TPA Treatment   Potentially administered, pending MRI brain. Delay due to further diagnostic evaluation to confirm stroke risk benefit. Risks and benefits of tPA were discussed with Patient prior to administration.     Endovascular Treatment  Not initiated due to absence of proximal vessel occlusion    Impression  Peripheral vertigo- MRI negative for restricted diffusion, recommend symptomatic management    Recommendations  Hyperacute brain MRI to clarify diagnosis prior to administering lytics  If negative, symptomatic management of vertigo    Patient Follow-up    - final recommendation pending work-up  - no further stroke evaluation necessary, may need to see ENT vs general neuro for episodic vertigo    Thank you for this consult. We will continue to follow.     The Stroke Staff is Dr. Rivera.    Melania Mejia MD  Vascular Neurology Fellow  To page me or covering stroke neurology team member, click here: [facility]   Choose \"On Call\" tab at top, then search dropdown box for \"Neurology Adult\", select location, press Enter, then look for stroke/neuro ICU/telestroke.    ______________________________________________________    Past Medical History   No past medical history on file.  Past Surgical History   No past surgical history on file.  Medications   Home Meds  Prior to Admission medications    Medication Sig Start Date End Date Taking? Authorizing Provider   meclizine (ANTIVERT) 25 MG tablet Take 1 tablet (25 mg) by mouth 3 times daily as needed for dizziness 12/28/20   Rosaura Rivas PA-C       Scheduled Meds      Infusion Meds      PRN Meds      Allergies "   No Known Allergies  Family History   No family history on file.  Social History   Social History     Tobacco Use     Smoking status: Not on file   Substance Use Topics     Alcohol use: Not on file     Drug use: Not on file       Review of Systems   The 10 point Review of Systems is negative other than noted in the HPI or here. Nausea, vomiting       PHYSICAL EXAMINATION  Temp:  [97.5  F (36.4  C)] 97.5  F (36.4  C)  Pulse:  [64-77] 77  Resp:  [9-20] 12  BP: (160-178)/() 167/99  SpO2:  [96 %-100 %] 98 %     Neurologic  Mental Status:  alert, oriented x 3, follows commands, speech clear and fluent, naming and repetition normal  Cranial Nerves:  visual fields intact (tested by nurse), facial movements symmetric, hearing not formally tested but intact to conversation, no dysarthria, shoulder shrug equal bilaterally, tongue protrusion midline, diminished sensation L cheeck, unwilling to look L due to provoked vertigo and nausea, gaze intact to R, conjugate at rest, no nystagmus appreciable  Motor:  no abnormal movements, RUE, RLE, LUE intact no drift, LUE slowed rapid finger tapping, LLE drift, intact distally  Reflexes:  unable to test (telestroke)  Sensory:  light touch sensation intact and symmetric throughout upper and lower extremities (assessed by nurse), no extinction on double simultaneous stimulation (assessed by nurse)  Coordination:  FNF intact bilaterally, H2S intact on R, impaired, out of proportion to weakness on L  Station/Gait:  unable to test due to telestroke      Dysphagia Screen  Per Nursing    Stroke Scales    NIHSS  Interval baseline (01/01/21 1830)   Interval Comments     1a. Level of Consciousness 0-->Alert, keenly responsive   1b. LOC Questions 0-->Answers both questions correctly   1c. LOC Commands 0-->Performs both tasks correctly   2.   Best Gaze 0-->Normal   3.   Visual 0-->No visual loss   4.   Facial Palsy 0-->Normal symmetrical movements   5a. Motor Arm, Left 0-->No drift, limb  holds 90 (or 45) degrees for full 10 secs   5b. Motor Arm, Right 0-->No drift, limb holds 90 (or 45) degrees for full 10 secs   6a. Motor Leg, Left 1-->Drift, leg falls by the end of the 5-sec period but does not hit bed   6b. Motor Leg, right 0-->No drift, leg holds 30 degree position for full 5 secs   7.   Limb Ataxia 1-->Present in one limb   8.   Sensory 1-->Mild-to-moderate sensory loss, patient feels pinprick is less sharp or is dull on the affected side, or there is a loss of superficial pain with pinprick, but patient is aware of being touched   9.   Best Language 0-->No aphasia, normal   10. Dysarthria 0-->Normal   11. Extinction and Inattention  0-->No abnormality   Total 3 (01/01/21 1830)       Imaging  I personally reviewed all imaging; relevant findings per HPI.     Lab Results Data   CBC  Recent Labs   Lab 01/01/21 1714 12/28/20 2028   WBC 4.9 6.1   RBC 4.53 4.70   HGB 12.3 13.1   HCT 41.1 41.8    261     Basic Metabolic Panel    Recent Labs   Lab 01/01/21 1714 12/28/20 2028    140   POTASSIUM 3.2* 3.2*   CHLORIDE 106 103   CO2 29 31   BUN 14 15   CR 0.82 1.02   * 102*   ANA M 8.9 9.2     Liver Panel  Recent Labs   Lab 12/28/20 2028   PROTTOTAL 7.3   ALBUMIN 3.4   BILITOTAL 0.2   ALKPHOS 78   AST 13   ALT 13     INR    Recent Labs   Lab Test 01/01/21 1714   INR 0.91      Lipid Profile  No lab results found.  A1C  No lab results found.  Troponin I    Recent Labs   Lab 01/01/21 1714 12/28/20 2028   TROPI <0.015 <0.015          Stroke Code / Stroke Consult Data Data   Stroke Code Data  (for stroke code with tele)  Stroke code activated 01/01/21   1713   First stroke provider response 01/01/21   1713   Video start time 01/01/21   1758   Video end time 01/01/21   1815   Last known normal 01/01/21   1654   Time of discovery  (or onset of symptoms)  01/01/21   1655   Head CT read by me 01/01/21   1720   Was stroke code de-escalated? Yes 01/01/21 1905  symptoms not likely caused by  stroke        Telestroke Service Details  Type of service telemedicine diagnostic assessment of acute neurological changes   Reason telemedicine is appropriate patient requires assessment with a specialist for diagnosis and treatment of neurological symptoms   Mode of transmission secure interactive audio and video communication per Alize   Originating site (patient location) [facility]    Distant site (provider location) [facility]

## 2021-01-02 NOTE — ED NOTES
Pt ambulated to bathroom with a stand-by assist but said she felt steady. She appeared stable to ERT. RN notified.

## 2021-01-02 NOTE — DISCHARGE INSTRUCTIONS
"What do you do next:   Continue your home medications unless we have specifically changed them  You may use the \"meclizine\" that you are prescribed on December 28 every 8 hours as needed for dizziness.  You should rest this weekend.  You may use over-the-counter Tylenol products to help with headache.  Follow the directions on the label and do not use more than 3000 mg total in a 24-hour period.  I have printed instructions for something called the \"Epley maneuver\".  This may help with your dizziness symptoms.  Follow up as indicated below    When do you return: If you have severe headache, lightheadedness or fainting, severe dizziness, difficulty ambulating or difficulty with coordination, or any other symptoms that concern you, please return to the ED for reevaluation.    Thank you for allowing us to care for you today.    "

## 2021-01-03 LAB — INTERPRETATION ECG - MUSE: NORMAL

## 2021-04-17 ENCOUNTER — HEALTH MAINTENANCE LETTER (OUTPATIENT)
Age: 51
End: 2021-04-17

## 2021-10-02 ENCOUNTER — HEALTH MAINTENANCE LETTER (OUTPATIENT)
Age: 51
End: 2021-10-02

## 2021-11-01 ENCOUNTER — HOSPITAL ENCOUNTER (EMERGENCY)
Facility: CLINIC | Age: 51
Discharge: HOME | End: 2021-11-01
Attending: EMERGENCY MEDICINE | Admitting: EMERGENCY MEDICINE
Payer: COMMERCIAL

## 2021-11-01 ENCOUNTER — APPOINTMENT (OUTPATIENT)
Dept: GENERAL RADIOLOGY | Facility: CLINIC | Age: 51
End: 2021-11-01
Attending: EMERGENCY MEDICINE
Payer: COMMERCIAL

## 2021-11-01 VITALS
DIASTOLIC BLOOD PRESSURE: 86 MMHG | WEIGHT: 257 LBS | HEART RATE: 64 BPM | TEMPERATURE: 98.6 F | HEIGHT: 63 IN | RESPIRATION RATE: 20 BRPM | OXYGEN SATURATION: 98 % | SYSTOLIC BLOOD PRESSURE: 153 MMHG | BODY MASS INDEX: 45.54 KG/M2

## 2021-11-01 DIAGNOSIS — U07.1 INFECTION DUE TO 2019 NOVEL CORONAVIRUS: ICD-10-CM

## 2021-11-01 LAB
ATRIAL RATE - MUSE: 75 BPM
DIASTOLIC BLOOD PRESSURE - MUSE: NORMAL MMHG
INTERPRETATION ECG - MUSE: NORMAL
P AXIS - MUSE: 33 DEGREES
PR INTERVAL - MUSE: 150 MS
QRS DURATION - MUSE: 86 MS
QT - MUSE: 426 MS
QTC - MUSE: 475 MS
R AXIS - MUSE: 41 DEGREES
SYSTOLIC BLOOD PRESSURE - MUSE: NORMAL MMHG
T AXIS - MUSE: 39 DEGREES
VENTRICULAR RATE- MUSE: 75 BPM

## 2021-11-01 PROCEDURE — 96360 HYDRATION IV INFUSION INIT: CPT

## 2021-11-01 PROCEDURE — 93005 ELECTROCARDIOGRAM TRACING: CPT

## 2021-11-01 PROCEDURE — 71045 X-RAY EXAM CHEST 1 VIEW: CPT

## 2021-11-01 PROCEDURE — 99284 EMERGENCY DEPT VISIT MOD MDM: CPT | Mod: 25

## 2021-11-01 PROCEDURE — 258N000003 HC RX IP 258 OP 636: Performed by: EMERGENCY MEDICINE

## 2021-11-01 PROCEDURE — 250N000013 HC RX MED GY IP 250 OP 250 PS 637: Performed by: EMERGENCY MEDICINE

## 2021-11-01 RX ORDER — ACETAMINOPHEN 500 MG
1000 TABLET ORAL EVERY 4 HOURS PRN
Status: DISCONTINUED | OUTPATIENT
Start: 2021-11-01 | End: 2021-11-01 | Stop reason: HOSPADM

## 2021-11-01 RX ORDER — SODIUM CHLORIDE 9 MG/ML
INJECTION, SOLUTION INTRAVENOUS CONTINUOUS
Status: DISCONTINUED | OUTPATIENT
Start: 2021-11-01 | End: 2021-11-01 | Stop reason: HOSPADM

## 2021-11-01 RX ORDER — IBUPROFEN 800 MG/1
800 TABLET, FILM COATED ORAL ONCE
Status: COMPLETED | OUTPATIENT
Start: 2021-11-01 | End: 2021-11-01

## 2021-11-01 RX ADMIN — IBUPROFEN 800 MG: 800 TABLET, FILM COATED ORAL at 11:08

## 2021-11-01 RX ADMIN — SODIUM CHLORIDE 1000 ML: 9 INJECTION, SOLUTION INTRAVENOUS at 11:10

## 2021-11-01 ASSESSMENT — ENCOUNTER SYMPTOMS
MYALGIAS: 1
COUGH: 1
SHORTNESS OF BREATH: 1

## 2021-11-01 ASSESSMENT — MIFFLIN-ST. JEOR: SCORE: 1759.87

## 2021-11-01 NOTE — DISCHARGE INSTRUCTIONS
As we discussed, all of your symptoms are likely related to coronavirus.  Your x-ray does show bilateral lung involvement, and you need to come back to the ER immediately if the pulse oximeter that we gave you to go home with, shows that you are anywhere in the 80s, or below 90%.  Come back to the ER immediately with any chest pain, any concerns you have it may be getting worse, or with any other questions.  Be sure to follow with your regular doctor to discuss the next 3 days

## 2021-11-01 NOTE — ED PROVIDER NOTES
"  History   Chief Complaint:  Shortness of Breath       HPI   Bita Umana is a 51 year old female who tested positive for COVID on 10/26 who presents with shortness of breath. Per the patient, she has had cough, shortness of breath, and myalgias. She has not taken any Tylenol or Motrin today.    Review of Systems   Respiratory: Positive for cough and shortness of breath.    Musculoskeletal: Positive for myalgias.   All other systems reviewed and are negative.        Allergies:  Amlodipine    Medications:  Albuterol  Guaifenesin-codeine  Losartan  Flonase  Omeprazole  Meclizine  Flexeril  Gabapentin  Revia  Wellbutrin  Calcium citrate-vitamin D    Past Medical History:     AJ on CPAP  Vitamin D deficiency  Primary osteoarthritis of both knees and hips  Hypertension  Gastric reflux  Fibroid uterus    Past Surgical History:    Tubal ligation  Limb-length discrepancy surgery, left hip  [place] teeth extraction  D&C    Family History:    Father: Lung cancer, cataract, glaucoma, hypertension  Mother: Arthritis     Social History:  The patient was accompanied to the ER by her spouse  Marital Status:     Physical Exam     Patient Vitals for the past 24 hrs:   BP Temp Temp src Pulse Resp SpO2 Height Weight   11/01/21 1130 (!) 149/88 -- -- 70 -- 96 % -- --   11/01/21 1115 (!) 132/92 -- -- 70 -- 95 % -- --   11/01/21 1100 (!) 128/92 -- -- 74 -- 93 % -- --   11/01/21 1045 (!) 153/103 -- -- 71 -- 94 % -- --   11/01/21 1030 (!) 144/99 -- -- 79 -- 94 % -- --   11/01/21 1022 (!) 145/99 -- -- 78 -- 94 % -- --   11/01/21 1015 (!) 132/101 -- -- 87 24 94 % -- --   11/01/21 1010 (!) 158/108 -- -- 82 -- 94 % -- --   11/01/21 0920 (!) 143/95 98.6  F (37  C) Temporal 80 24 93 % 1.6 m (5' 3\") 116.6 kg (257 lb)       Physical Exam  Vitals: reviewed by me  General: Pt seen on hospital gurney, pleasant, cooperative, and alert to conversation, sick appearing, nontoxic  Eyes: Tracking well, clear conjunctiva BL  ENT: MMM, midline " trachea.   Lungs: No tachypnea, no accessory muscle use. No respiratory distress.   CV: Rate as above  Abd: Soft, non tender, no guarding, no rebound. Non distended  MSK: no joint effusion.  No evidence of trauma  Skin: No rash  Neuro: Clear speech and no facial droop.  Psych: Not RIS, no e/o AH/VH      Emergency Department Course   ECG  ECG obtained at 0955, ECG read at 1002  Normal sinus rhythm  Normal ECG   Rate 75 bpm. AZ interval 150 ms. QRS duration 86 ms. QT/QTc 426/475 ms. P-R-T axes 33 41 39.     Imaging:    XR Chest Port 1 View   Final Result   IMPRESSION: New multifocal mild patchy opacities in both lungs,   consistent with pneumonia. No pleural effusion or pneumothorax. Normal   heart size.      LINDSAY HANNON MD            SYSTEM ID:  [id number]          The above imaging workup was performed.   Report per radiology    Emergency Department Course:  Reviewed:  I reviewed nursing notes, vitals and past medical history    Assessments:  0940 I obtained history and examined the patient as noted above.   1146 I rechecked the patient and explained findings. She feels much improved.    Interventions:  1108: Ibuprofen, 800 mg, Oral  1110: Normal Saline, 1 liter, IV bolus     Disposition:  The patient was discharged to home.     Impression & Plan       Medical Decision Making:  This is a pleasant 42-year-old female presents the emergency room with appears to be a progressing Covid infection. She was not vaccinated, and her x-ray does show bilateral groundglass opacities consistent with coronavirus. Her oxygen saturation is reassuring, she sats roughly in the mid 90s, even when ambulated. I do not think she is to be admitted to the hospital and I do not think qualifies for Decadron. She feels better here with Tylenol Motrin and was asking for a liter of fluids as well, which was given. He does not feels better after the fluids. He is ambulatory, able do all of her ADLs, understands that no treatment is available at  this time, but I gave her a pulse oximeter, and she also come back to the ER immediately if she starts to read in the 80s.    Covid-19  Bita Umana was evaluated during a global COVID-19 pandemic, which necessitated consideration that the patient might be at risk for infection with the SARS-CoV-2 virus that causes COVID-19.   Applicable protocols for evaluation were followed during the patient's care.   COVID-19 was considered as part of the patient's evaluation. The plan for testing is:  a test was obtained at a previous visit and reviewed & considered today.      Diagnosis:    ICD-10-CM    1. Infection due to 2019 novel coronavirus  U07.1        Scribe Disclosure:  I, Ty Wilcox, am serving as a scribe at 9:36 AM on 11/1/2021 to document services personally performed by Abdon Valdivia MD based on my observations and the provider's statements to me.        Abdon Valdivia MD  11/01/21 1500

## 2022-05-08 ENCOUNTER — HEALTH MAINTENANCE LETTER (OUTPATIENT)
Age: 52
End: 2022-05-08

## 2022-05-23 ENCOUNTER — APPOINTMENT (OUTPATIENT)
Dept: CT IMAGING | Facility: CLINIC | Age: 52
End: 2022-05-23
Attending: EMERGENCY MEDICINE
Payer: COMMERCIAL

## 2022-05-23 ENCOUNTER — HOSPITAL ENCOUNTER (EMERGENCY)
Facility: CLINIC | Age: 52
Discharge: HOME | End: 2022-05-24
Attending: EMERGENCY MEDICINE | Admitting: EMERGENCY MEDICINE
Payer: COMMERCIAL

## 2022-05-23 ENCOUNTER — APPOINTMENT (OUTPATIENT)
Dept: ULTRASOUND IMAGING | Facility: CLINIC | Age: 52
End: 2022-05-23
Attending: EMERGENCY MEDICINE
Payer: COMMERCIAL

## 2022-05-23 DIAGNOSIS — R59.0 ANTERIOR CERVICAL LYMPHADENOPATHY: ICD-10-CM

## 2022-05-23 DIAGNOSIS — K85.90 ACUTE PANCREATITIS, UNSPECIFIED COMPLICATION STATUS, UNSPECIFIED PANCREATITIS TYPE: ICD-10-CM

## 2022-05-23 DIAGNOSIS — R51.9 NONINTRACTABLE HEADACHE, UNSPECIFIED CHRONICITY PATTERN, UNSPECIFIED HEADACHE TYPE: ICD-10-CM

## 2022-05-23 DIAGNOSIS — R42 VERTIGO: ICD-10-CM

## 2022-05-23 LAB
ALBUMIN SERPL-MCNC: 3.5 G/DL (ref 3.4–5)
ALP SERPL-CCNC: 77 U/L (ref 40–150)
ALT SERPL W P-5'-P-CCNC: 17 U/L (ref 0–50)
ANION GAP SERPL CALCULATED.3IONS-SCNC: 3 MMOL/L (ref 3–14)
AST SERPL W P-5'-P-CCNC: 12 U/L (ref 0–45)
BASOPHILS # BLD AUTO: 0 10E3/UL (ref 0–0.2)
BASOPHILS NFR BLD AUTO: 1 %
BILIRUB DIRECT SERPL-MCNC: <0.1 MG/DL (ref 0–0.2)
BILIRUB SERPL-MCNC: 0.1 MG/DL (ref 0.2–1.3)
BUN SERPL-MCNC: 18 MG/DL (ref 7–30)
CALCIUM SERPL-MCNC: 9.2 MG/DL (ref 8.5–10.1)
CHLORIDE BLD-SCNC: 104 MMOL/L (ref 94–109)
CO2 SERPL-SCNC: 27 MMOL/L (ref 20–32)
CREAT SERPL-MCNC: 0.9 MG/DL (ref 0.52–1.04)
EOSINOPHIL # BLD AUTO: 0.2 10E3/UL (ref 0–0.7)
EOSINOPHIL NFR BLD AUTO: 3 %
ERYTHROCYTE [DISTWIDTH] IN BLOOD BY AUTOMATED COUNT: 15.1 % (ref 10–15)
FLUAV RNA SPEC QL NAA+PROBE: NEGATIVE
FLUBV RNA RESP QL NAA+PROBE: NEGATIVE
GFR SERPL CREATININE-BSD FRML MDRD: 78 ML/MIN/1.73M2
GLUCOSE BLD-MCNC: 84 MG/DL (ref 70–99)
HCT VFR BLD AUTO: 37.9 % (ref 35–47)
HGB BLD-MCNC: 11.1 G/DL (ref 11.7–15.7)
HOLD SPECIMEN: NORMAL
IMM GRANULOCYTES # BLD: 0 10E3/UL
IMM GRANULOCYTES NFR BLD: 0 %
LIPASE SERPL-CCNC: 1013 U/L (ref 73–393)
LYMPHOCYTES # BLD AUTO: 1.6 10E3/UL (ref 0.8–5.3)
LYMPHOCYTES NFR BLD AUTO: 31 %
MCH RBC QN AUTO: 25.3 PG (ref 26.5–33)
MCHC RBC AUTO-ENTMCNC: 29.3 G/DL (ref 31.5–36.5)
MCV RBC AUTO: 86 FL (ref 78–100)
MONOCYTES # BLD AUTO: 0.4 10E3/UL (ref 0–1.3)
MONOCYTES NFR BLD AUTO: 9 %
NEUTROPHILS # BLD AUTO: 2.9 10E3/UL (ref 1.6–8.3)
NEUTROPHILS NFR BLD AUTO: 56 %
NRBC # BLD AUTO: 0 10E3/UL
NRBC BLD AUTO-RTO: 0 /100
PLATELET # BLD AUTO: 294 10E3/UL (ref 150–450)
POTASSIUM BLD-SCNC: 3.8 MMOL/L (ref 3.4–5.3)
PROT SERPL-MCNC: 7.6 G/DL (ref 6.8–8.8)
RBC # BLD AUTO: 4.39 10E6/UL (ref 3.8–5.2)
RSV RNA SPEC NAA+PROBE: NEGATIVE
SARS-COV-2 RNA RESP QL NAA+PROBE: NEGATIVE
SODIUM SERPL-SCNC: 134 MMOL/L (ref 133–144)
TRIGL SERPL-MCNC: 112 MG/DL
TROPONIN I SERPL HS-MCNC: 25 NG/L
WBC # BLD AUTO: 5.2 10E3/UL (ref 4–11)

## 2022-05-23 PROCEDURE — 250N000009 HC RX 250: Performed by: EMERGENCY MEDICINE

## 2022-05-23 PROCEDURE — C9803 HOPD COVID-19 SPEC COLLECT: HCPCS

## 2022-05-23 PROCEDURE — 76705 ECHO EXAM OF ABDOMEN: CPT

## 2022-05-23 PROCEDURE — 84484 ASSAY OF TROPONIN QUANT: CPT | Performed by: EMERGENCY MEDICINE

## 2022-05-23 PROCEDURE — 250N000011 HC RX IP 250 OP 636: Performed by: EMERGENCY MEDICINE

## 2022-05-23 PROCEDURE — 36415 COLL VENOUS BLD VENIPUNCTURE: CPT | Performed by: EMERGENCY MEDICINE

## 2022-05-23 PROCEDURE — 85025 COMPLETE CBC W/AUTO DIFF WBC: CPT | Performed by: EMERGENCY MEDICINE

## 2022-05-23 PROCEDURE — 258N000003 HC RX IP 258 OP 636: Performed by: EMERGENCY MEDICINE

## 2022-05-23 PROCEDURE — 70496 CT ANGIOGRAPHY HEAD: CPT

## 2022-05-23 PROCEDURE — 80048 BASIC METABOLIC PNL TOTAL CA: CPT | Performed by: EMERGENCY MEDICINE

## 2022-05-23 PROCEDURE — 83690 ASSAY OF LIPASE: CPT | Performed by: EMERGENCY MEDICINE

## 2022-05-23 PROCEDURE — 96361 HYDRATE IV INFUSION ADD-ON: CPT

## 2022-05-23 PROCEDURE — 96374 THER/PROPH/DIAG INJ IV PUSH: CPT | Mod: 25

## 2022-05-23 PROCEDURE — 250N000013 HC RX MED GY IP 250 OP 250 PS 637: Performed by: EMERGENCY MEDICINE

## 2022-05-23 PROCEDURE — 84478 ASSAY OF TRIGLYCERIDES: CPT | Performed by: EMERGENCY MEDICINE

## 2022-05-23 PROCEDURE — 93005 ELECTROCARDIOGRAM TRACING: CPT

## 2022-05-23 PROCEDURE — 70450 CT HEAD/BRAIN W/O DYE: CPT

## 2022-05-23 PROCEDURE — 99285 EMERGENCY DEPT VISIT HI MDM: CPT | Mod: CS,25

## 2022-05-23 PROCEDURE — 87637 SARSCOV2&INF A&B&RSV AMP PRB: CPT | Performed by: EMERGENCY MEDICINE

## 2022-05-23 PROCEDURE — 70498 CT ANGIOGRAPHY NECK: CPT

## 2022-05-23 PROCEDURE — 82248 BILIRUBIN DIRECT: CPT | Performed by: EMERGENCY MEDICINE

## 2022-05-23 PROCEDURE — 80053 COMPREHEN METABOLIC PANEL: CPT | Performed by: EMERGENCY MEDICINE

## 2022-05-23 RX ORDER — KETOROLAC TROMETHAMINE 15 MG/ML
15 INJECTION, SOLUTION INTRAMUSCULAR; INTRAVENOUS ONCE
Status: COMPLETED | OUTPATIENT
Start: 2022-05-23 | End: 2022-05-23

## 2022-05-23 RX ORDER — IOPAMIDOL 755 MG/ML
500 INJECTION, SOLUTION INTRAVASCULAR ONCE
Status: COMPLETED | OUTPATIENT
Start: 2022-05-23 | End: 2022-05-23

## 2022-05-23 RX ORDER — ACETAMINOPHEN 500 MG
1000 TABLET ORAL ONCE
Status: COMPLETED | OUTPATIENT
Start: 2022-05-23 | End: 2022-05-23

## 2022-05-23 RX ADMIN — KETOROLAC TROMETHAMINE 15 MG: 15 INJECTION, SOLUTION INTRAMUSCULAR; INTRAVENOUS at 21:40

## 2022-05-23 RX ADMIN — SODIUM CHLORIDE 80 ML: 9 INJECTION, SOLUTION INTRAVENOUS at 22:32

## 2022-05-23 RX ADMIN — ACETAMINOPHEN 1000 MG: 500 TABLET, FILM COATED ORAL at 17:55

## 2022-05-23 RX ADMIN — ALUMINUM HYDROXIDE, MAGNESIUM HYDROXIDE, AND SIMETHICONE 30 ML: 200; 200; 20 SUSPENSION ORAL at 21:08

## 2022-05-23 RX ADMIN — IOPAMIDOL 75 ML: 755 INJECTION, SOLUTION INTRAVENOUS at 22:30

## 2022-05-23 RX ADMIN — SODIUM CHLORIDE 1000 ML: 9 INJECTION, SOLUTION INTRAVENOUS at 21:40

## 2022-05-23 NOTE — LETTER
May 24, 2022      To Whom It May Concern:      Bita Umana was seen in our Emergency Department today, 05/24/22.  Please excuse her from work this week due to medical concern.    Sincerely,        Sanjeev Grissom MD

## 2022-05-23 NOTE — ED TRIAGE NOTES
Patient referred here from  for cardiac workup  . Patient having dizziness and SOB since Wednesday. When turning head to the left or when bending over dizziness becomes worse. Thursday, Friday, and Saturday the whole room was spinning.

## 2022-05-24 VITALS
DIASTOLIC BLOOD PRESSURE: 96 MMHG | TEMPERATURE: 97.5 F | SYSTOLIC BLOOD PRESSURE: 142 MMHG | OXYGEN SATURATION: 99 % | RESPIRATION RATE: 18 BRPM | HEART RATE: 68 BPM

## 2022-05-24 LAB
ATRIAL RATE - MUSE: 73 BPM
DIASTOLIC BLOOD PRESSURE - MUSE: NORMAL MMHG
INTERPRETATION ECG - MUSE: NORMAL
P AXIS - MUSE: 46 DEGREES
PR INTERVAL - MUSE: 160 MS
QRS DURATION - MUSE: 80 MS
QT - MUSE: 410 MS
QTC - MUSE: 451 MS
R AXIS - MUSE: 68 DEGREES
SYSTOLIC BLOOD PRESSURE - MUSE: NORMAL MMHG
T AXIS - MUSE: 42 DEGREES
VENTRICULAR RATE- MUSE: 73 BPM

## 2022-05-24 NOTE — ED PROVIDER NOTES
History     Chief Complaint:  Dizziness       HPI   Bita Umana is a 52 year old female who presents with multiple concerns including headache, dizziness, upper abdominal pain, left-sided face and neck discomfort.  Symptoms started 4 to 5 days ago.  She woke up with room spinning sensation and severe headache.  Vertigo and headache have improved since onset.  Vertigo is absent at rest and brought on with turning her head to the left.  No fever or cough or vomiting or diarrhea.  Abdominal pain is brought on or worsened by anything specific but it may be worse after eating.  She mentioned possible shortness of breath in triage but clarifies that she is not short of breath or having chest pain.  She mentions tenderness along the left jaw and ear region but no sore throat or trouble swallowing or neck stiffness.    ROS:  Review of Systems  A 10 point ROS was obtained and negative except as noted here and in HPI      Allergies:  Amlodipine     Medications:    amoxicillin-clavulanate (AUGMENTIN) 875-125 MG tablet  meclizine (ANTIVERT) 25 MG tablet        Past Medical History:    No past medical history on file.    Past Surgical History:    No past surgical history on file.     Family History:    family history is not on file.    Social History:   reports that she has quit smoking. She has never used smokeless tobacco.  PCP: [facility], [place]     Physical Exam     Patient Vitals for the past 24 hrs:   BP Temp Temp src Pulse Resp SpO2   05/24/22 0012 (!) 142/96 -- -- 68 18 99 %   05/23/22 2200 (!) 150/98 -- -- 73 -- 100 %   05/23/22 2130 (!) 150/105 -- -- 70 -- 97 %   05/23/22 2115 (!) 142/97 -- -- -- -- --   05/23/22 1644 (!) 146/100 97.5  F (36.4  C) Temporal 96 20 100 %        Physical Exam  VS: Reviewed per above  HENT: Mucous membranes moist. Uvula midline, no tonsillar hypertrophy nor asymmetry. Tolerating secretions, normal phonation. No nuchal rigidity. Tender anterior cervical LAD, worse on the left  submandibular gland.  Bilateral mastoids are nontender.  Bilateral TMs are without bulging or injection.  No debris or erythema of the external otic canals bilaterally.  EYES: sclera anicteric  CV: Rate as noted, regular rhythm.   RESP: Effort normal. Breath sounds are normal bilaterally.  GI: mild epigastric tenderness without rebound/guarding, not distended.  NEURO: GCS 15, cranial nerves II through XII are intact, 5 out of 5 strength in all 4 extremities, sensation is intact light touch in all 4 extremities  MSK: No deformity of the extremities  SKIN: Warm and dry    Emergency Department Course   ECG:  ECG taken 1715, ECG read 1731  ECG results from 05/23/22   EKG 12 lead     Value    Systolic Blood Pressure     Diastolic Blood Pressure     Ventricular Rate 73    Atrial Rate 73    OR Interval 160    QRS Duration 80        QTc 451    P Axis 46    R AXIS 68    T Axis 42    Interpretation ECG      Sinus rhythm  Possible Left atrial enlargement  Borderline ECG  When compared with ECG of 01-NOV-2021 09:55,  No significant change was found         Imaging:  Abdomen US, limited (RUQ only)   Final Result   IMPRESSION:   1.  Normal right upper quadrant. No gallstone or biliary dilatation.            CTA Head Neck with Contrast   Final Result   IMPRESSION:    HEAD CT:   1.  No acute intracranial process.      HEAD CTA:    1.  No stenosis/occlusion, aneurysm, or high flow vascular malformation.      NECK CTA:   1.  No measurable stenosis or dissection.      Head CT w/o contrast   Final Result   IMPRESSION:    HEAD CT:   1.  No acute intracranial process.      HEAD CTA:    1.  No stenosis/occlusion, aneurysm, or high flow vascular malformation.      NECK CTA:   1.  No measurable stenosis or dissection.         Report per radiology    Laboratory:  Labs Ordered and Resulted from Time of ED Arrival to Time of ED Departure   CBC WITH PLATELETS AND DIFFERENTIAL - Abnormal       Result Value    WBC Count 5.2      RBC Count  4.39      Hemoglobin 11.1 (*)     Hematocrit 37.9      MCV 86      MCH 25.3 (*)     MCHC 29.3 (*)     RDW 15.1 (*)     Platelet Count 294      % Neutrophils 56      % Lymphocytes 31      % Monocytes 9      % Eosinophils 3      % Basophils 1      % Immature Granulocytes 0      NRBCs per 100 WBC 0      Absolute Neutrophils 2.9      Absolute Lymphocytes 1.6      Absolute Monocytes 0.4      Absolute Eosinophils 0.2      Absolute Basophils 0.0      Absolute Immature Granulocytes 0.0      Absolute NRBCs 0.0     LIPASE - Abnormal    Lipase 1,013 (*)    HEPATIC FUNCTION PANEL - Abnormal    Bilirubin Total 0.1 (*)     Bilirubin Direct <0.1      Protein Total 7.6      Albumin 3.5      Alkaline Phosphatase 77      AST 12      ALT 17     BASIC METABOLIC PANEL - Normal    Sodium 134      Potassium 3.8      Chloride 104      Carbon Dioxide (CO2) 27      Anion Gap 3      Urea Nitrogen 18      Creatinine 0.90      Calcium 9.2      Glucose 84      GFR Estimate 78     TROPONIN I - Normal    Troponin I High Sensitivity 25     INFLUENZA A/B & SARS-COV2 PCR MULTIPLEX - Normal    Influenza A PCR Negative      Influenza B PCR Negative      RSV PCR Negative      SARS CoV2 PCR Negative     TRIGLYCERIDES - Normal    Triglycerides 112         Emergency Department Course:             Reviewed:  I reviewed nursing notes, vitals and past medical history    Assessments:   I obtained history and examined the patient as noted above.    I rechecked the patient and explained findings.     Interventions:  Medications   acetaminophen (TYLENOL) tablet 1,000 mg (1,000 mg Oral Given 5/23/22 1755)   lidocaine (viscous) (XYLOCAINE) 2 % 15 mL, alum & mag hydroxide-simethicone (MAALOX) 15 mL GI Cocktail (30 mLs Oral Given 5/23/22 2108)   0.9% sodium chloride BOLUS (0 mLs Intravenous Stopped 5/24/22 0004)   ketorolac (TORADOL) injection 15 mg (15 mg Intravenous Given 5/23/22 2140)   CT Scan Flush (80 mLs Intravenous Given 5/23/22 2232)   iopamidol (ISOVUE-370)  solution 500 mL (75 mLs Intravenous Given 5/23/22 2230)        Disposition:  The patient was discharged to home.     Impression & Plan      Medical Decision Making:  Patient presents to the ER for evaluation of multiple concerns including headache, left-sided neck pain and ear pain, upper abdominal pain, dizziness.    With respect to headache, it sounds as though it came on rather suddenly 4 to 5 days ago.  CT of the head and CTA of the head and neck did not show intracranial hemorrhage or evidence of aneurysm or dissection.  No clinical signs of meningitis.  No clinical signs of stroke.  Headache resolved after IV fluids and Toradol.     With respect to the left neck/ear/facial pain, patient does have some tender anterior cervical and submandibular lymphadenopathy but no other pathology in the neck detected on CT imaging.  No clinical signs of otitis media/otitis externa. No clinical signs facial cellulitis.  I will provide course of Augmentin for possible occult lymphadenitis contributing to her tender left-sided anterior cervical and submandibular lymphadenopathy.    With respect to dizziness, patient has no symptoms at rest but vertigo is brought on with turning head to the left.  No other focal neurodeficits.  I considered peripheral etiologies of vertigo including BPPV, AOM, meniere's disease, and vestibular neuritis. I also considered central etiologies including meningitis, encephalitis, vertebral basilar insufficiency/CVA, cerebellar hemorrhage, and tumor.  At this time based on clinical exam and hx and neuroimaging, peripheral etiology seems most likely.  Graphic explaining Epley maneuver was provided to patient.  Encouraged meclizine use.    With respect abdominal pain, patient does have evidence of pancreatitis.  No signs of gallstone pancreatitis.  Patient denies alcohol use.  No signs of hypertriglyceridemia.  Pain is relatively well controlled after Toradol and IV fluids.  She is tolerating p.o.   Based on response to interventions and reassuring abdominal exam, low suspicion for other sinister or surgical intra-abdominal process that would necessitate CT imaging.  EKG and troponin do not suggest atypical presentation of ACS.  She is agreeable with outpatient management of pancreatitis.  Discussed starting with liquid diet and slowly advancing diet as tolerated.  Encouraged ongoing primary care follow-up.    Return precautions discussed prior to discharge.    Diagnosis:    ICD-10-CM    1. Acute pancreatitis, unspecified complication status, unspecified pancreatitis type  K85.90    2. Vertigo  R42    3. Nonintractable headache, unspecified chronicity pattern, unspecified headache type  R51.9    4. Anterior cervical lymphadenopathy  R59.0         Discharge Medications:  Discharge Medication List as of 5/24/2022 12:04 AM      START taking these medications    Details   amoxicillin-clavulanate (AUGMENTIN) 875-125 MG tablet Take 1 tablet by mouth 2 times daily for 7 days, Disp-14 tablet, R-0, Local Print              5/23/2022   Sanjeev Grissom MD Lindenbaum, Elan, MD  05/24/22 0037       Sanjeev Grissom MD  05/24/22 0037

## 2022-07-03 ENCOUNTER — HEALTH MAINTENANCE LETTER (OUTPATIENT)
Age: 52
End: 2022-07-03

## 2023-01-14 ENCOUNTER — HEALTH MAINTENANCE LETTER (OUTPATIENT)
Age: 53
End: 2023-01-14

## 2023-06-02 ENCOUNTER — HEALTH MAINTENANCE LETTER (OUTPATIENT)
Age: 53
End: 2023-06-02

## 2024-07-06 ENCOUNTER — HEALTH MAINTENANCE LETTER (OUTPATIENT)
Age: 54
End: 2024-07-06